# Patient Record
Sex: MALE | URBAN - METROPOLITAN AREA
[De-identification: names, ages, dates, MRNs, and addresses within clinical notes are randomized per-mention and may not be internally consistent; named-entity substitution may affect disease eponyms.]

---

## 2018-10-09 ENCOUNTER — OFFICE VISIT (OUTPATIENT)
Dept: FAMILY MEDICINE CLINIC | Age: 23
End: 2018-10-09

## 2018-10-09 VITALS
RESPIRATION RATE: 16 BRPM | WEIGHT: 253.6 LBS | OXYGEN SATURATION: 99 % | BODY MASS INDEX: 36.31 KG/M2 | SYSTOLIC BLOOD PRESSURE: 131 MMHG | TEMPERATURE: 98.9 F | DIASTOLIC BLOOD PRESSURE: 88 MMHG | HEART RATE: 72 BPM | HEIGHT: 70 IN

## 2018-10-09 DIAGNOSIS — L08.9 STAPH SKIN INFECTION: Primary | ICD-10-CM

## 2018-10-09 DIAGNOSIS — B95.8 STAPH SKIN INFECTION: Primary | ICD-10-CM

## 2018-10-09 PROBLEM — E66.01 SEVERE OBESITY (HCC): Status: ACTIVE | Noted: 2018-10-09

## 2018-10-09 RX ORDER — MERCAPTOPURINE 50 MG/1
TABLET ORAL
Refills: 3 | COMMUNITY
Start: 2018-08-13

## 2018-10-09 RX ORDER — SULFAMETHOXAZOLE AND TRIMETHOPRIM 800; 160 MG/1; MG/1
1 TABLET ORAL 2 TIMES DAILY
Qty: 6 TAB | Refills: 0 | Status: SHIPPED | OUTPATIENT
Start: 2018-10-09 | End: 2018-10-16

## 2018-10-09 RX ORDER — MUPIROCIN 20 MG/G
OINTMENT TOPICAL
Qty: 30 G | Refills: 3 | Status: SHIPPED | OUTPATIENT
Start: 2018-10-09 | End: 2019-02-13

## 2018-10-09 RX ORDER — ONDANSETRON 8 MG/1
TABLET, ORALLY DISINTEGRATING ORAL
Refills: 2 | COMMUNITY
Start: 2018-10-04

## 2018-10-09 RX ORDER — INFLIXIMAB 100 MG/10ML
INJECTION, POWDER, LYOPHILIZED, FOR SOLUTION INTRAVENOUS
COMMUNITY

## 2018-10-09 RX ORDER — BUTALBITAL, ACETAMINOPHEN AND CAFFEINE 50; 325; 40 MG/1; MG/1; MG/1
TABLET ORAL
COMMUNITY
Start: 2018-10-04

## 2018-10-09 RX ORDER — LOPERAMIDE HCL 2 MG
1000 TABLET ORAL
COMMUNITY
Start: 2015-08-20

## 2018-10-09 RX ORDER — BUPROPION HYDROCHLORIDE 150 MG/1
TABLET ORAL
Refills: 0 | COMMUNITY
Start: 2018-08-11

## 2018-10-09 RX ORDER — AZELASTINE 1 MG/ML
SPRAY, METERED NASAL
Refills: 6 | COMMUNITY
Start: 2018-07-16

## 2018-10-09 RX ORDER — ALBUTEROL SULFATE 0.83 MG/ML
SOLUTION RESPIRATORY (INHALATION)
COMMUNITY
Start: 2016-05-31

## 2018-10-09 RX ORDER — LEVOCETIRIZINE DIHYDROCHLORIDE 5 MG/1
TABLET, FILM COATED ORAL
Refills: 5 | COMMUNITY
Start: 2018-09-19

## 2018-10-09 RX ORDER — MESALAMINE 375 MG/1
CAPSULE, EXTENDED RELEASE ORAL
Refills: 3 | COMMUNITY
Start: 2018-08-13

## 2018-10-09 RX ORDER — NYSTATIN AND TRIAMCINOLONE ACETONIDE 100000; 1 [USP'U]/G; MG/G
CREAM TOPICAL 2 TIMES DAILY
Qty: 30 G | Refills: 0 | Status: SHIPPED | OUTPATIENT
Start: 2018-10-09 | End: 2019-02-13

## 2018-10-09 RX ORDER — MONTELUKAST SODIUM 10 MG/1
10 TABLET ORAL
COMMUNITY

## 2018-10-09 RX ORDER — ALBUTEROL SULFATE 90 UG/1
2 AEROSOL, METERED RESPIRATORY (INHALATION)
COMMUNITY
Start: 2016-05-31

## 2018-10-09 NOTE — MR AVS SNAPSHOT
[address] [place] [address] [phone] 
[phone] Patient: Shey Martinez MRN: [id number] :1995 Visit Information Date & Time Provider Department Dept. Phone Encounter #  
 10/9/2018  9:30 AM Juanna Schaumann, MD [email or web address] [phone] [id number] Upcoming Health Maintenance Date Due DTaP/Tdap/Td series (1 - Tdap) 2017 Influenza Age 5 to Adult 2018 Allergies as of 10/9/2018  Review Complete On: 10/9/2018 By: Lyla Frias No Known Allergies Current Immunizations  Never Reviewed No immunizations on file. Not reviewed this visit You Were Diagnosed With   
  
 Codes Comments Staph skin infection    -  Primary ICD-10-CM: L08.9, B95.8 ICD-9-CM: 686.9, 041.10 Vitals BP Pulse Temp Resp Height(growth percentile) Weight(growth percentile) 131/88 (BP 1 Location: Left arm, BP Patient Position: Sitting) 72 98.9 °F (37.2 °C) (Oral) 16 5' 10\" (1.778 m) 253 lb 9.6 oz (115 kg) SpO2 BMI Smoking Status 99% 36.39 kg/m2 Never Smoker BMI and BSA Data Body Mass Index Body Surface Area  
 36.39 kg/m 2 2.38 m 2 Preferred Pharmacy Pharmacy Name Phone [id number] [address] Shamir Barrow [phone] Your Updated Medication List  
  
   
This list is accurate as of 10/9/18  9:49 AM.  Always use your most recent med list.  
  
  
  
  
 * albuterol 2.5 mg /3 mL (0.083 %) nebulizer solution Commonly known as:  PROVENTIL VENTOLIN Take by nebulization every 6 (six) hours as needed. * VENTOLIN HFA 90 mcg/actuation inhaler Generic drug:  albuterol Take 2 Puffs by inhalation. APRISO 0.375 gram 24 hour capsule Generic drug:  mesalamine ER  
TAKE 1 CAPSULE BY MOUTH 4 TIMES DAILY. azelastine 137 mcg (0.1 %) nasal spray Commonly known as:  ASTELIN  
INSTILL 2 SPRAYS IN EACH NOSTRIL TWICE A DAY  
  
 buPROPion  mg tablet Commonly known as:  WELLBUTRIN XL  
TAKE 1 TABLET BY MOUTH EVERY DAY IN THE MORNING  
  
 butalbital-acetaminophen-caffeine -40 mg per tablet Commonly known as:  FIORICET, ESGIC  
  
 cyanocobalamin ER 1,000 mcg tablet Take 1,000 mcg by mouth. inFLIXimab 100 mg injection Commonly known as:  REMICADE  
by IntraVENous route. levocetirizine 5 mg tablet Commonly known as:  Rose Pavy TAKE ONE TABLET BY MOUTH ONE TIME DAILY  
  
 mercaptopurine 50 mg tablet Commonly known as:  PURINETHOL  
TAKE 1/2 TABLET BY MOUTH DAILY  
  
 montelukast 10 mg tablet Commonly known as:  SINGULAIR Take 10 mg by mouth.  
  
 mupirocin 2 % ointment Commonly known as:  [facility] Apply to each side of nasal septum twice daily for 7 days  
  
 nystatin-triamcinolone topical cream  
Commonly known as:  MYCOLOG II Apply  to affected area two (2) times a day. ondansetron 8 mg disintegrating tablet Commonly known as:  ZOFRAN ODT  
TAKE 1 TABLET BY MOUTH EVERY 8 HOURS AS NEEDED  
  
 trimethoprim-sulfamethoxazole 160-800 mg per tablet Commonly known as:  BACTRIM DS, SEPTRA DS Take 1 Tab by mouth two (2) times a day for 7 days. * Notice: This list has 2 medication(s) that are the same as other medications prescribed for you. Read the directions carefully, and ask your doctor or other care provider to review them with you. Prescriptions Printed Refills  
 nystatin-triamcinolone (MYCOLOG II) topical cream 0 Sig: Apply  to affected area two (2) times a day. Class: Print Route: Topical  
  
Prescriptions Sent to Pharmacy Refills  
 trimethoprim-sulfamethoxazole (BACTRIM DS, SEPTRA DS) 160-800 mg per tablet 0 Sig: Take 1 Tab by mouth two (2) times a day for 7 days. Class: Normal  
 Pharmacy: [address] [place] [address], [address] [place] [address] #: [phone]  Route: Oral  
 mupirocin (BACTROBAN) 2 % ointment 3  
 Sig: Apply to each side of nasal septum twice daily for 7 days Class: Normal  
 Pharmacy: [address] [place] [address], [address] [facility] #: [phone] Patient Instructions Take the oral antibiotic as prescribed, and use the antibiotic ointment twice daily to each side of the nasal septum for 7 days. Get Hibiclens at your pharmacy and wash your body with it weekly. If the belly button rash doesn't clear up, fill the prescription for Mycolog and use it. Introducing [facility]! [facility] introduces KeepIdeas patient portal. Now you can access parts of your medical record, email your doctor's office, and request medication refills online. 1. In your internet browser, go to [email or web address] 2. Click on the First Time User? Click Here link in the Sign In box. You will see the New Member Sign Up page. 3. Enter your KeepIdeas Access Code exactly as it appears below. You will not need to use this code after youve completed the sign-up process. If you do not sign up before the expiration date, you must request a new code. · KeepIdeas Access Code: [email or web address] Expires: 1/7/2019  9:49 AM 
 
4. Enter the last four digits of your Social Security Number (xxxx) and Date of Birth (mm/dd/yyyy) as indicated and click Submit. You will be taken to the next sign-up page. 5. Create a KeepIdeas ID. This will be your KeepIdeas login ID and cannot be changed, so think of one that is secure and easy to remember. 6. Create a KeepIdeas password. You can change your password at any time. 7. Enter your Password Reset Question and Answer. This can be used at a later time if you forget your password. 8. Enter your e-mail address. You will receive e-mail notification when new information is available in [address] 9. Click Sign Up. You can now view and download portions of your medical record.  
10. Click the Download Summary menu link to download a portable copy of your medical information. If you have questions, please visit the Frequently Asked Questions section of the InVision website. Remember, InVision is NOT to be used for urgent needs. For medical emergencies, dial 911. Now available from your iPhone and Android! Please provide this summary of care documentation to your next provider. If you have any questions after today's visit, please call [phone].

## 2018-10-09 NOTE — PATIENT INSTRUCTIONS
Take the oral antibiotic as prescribed, and use the antibiotic ointment twice daily to each side of the nasal septum for 7 days. Get Hibiclens at your pharmacy and wash your body with it weekly. If the belly button rash doesn't clear up, fill the prescription for Mycolog and use it.

## 2018-10-09 NOTE — PROGRESS NOTES
Rm 2 
Patient presents to the clinic Chief Complaint Patient presents with  
 Skin Problem in belly button and nostril Depression Screening: PHQ over the last two weeks 10/9/2018 Little interest or pleasure in doing things Not at all Feeling down, depressed, irritable, or hopeless Not at all Total Score PHQ 2 0 Learning Assessment: 
Learning Assessment 10/9/2018 PRIMARY LEARNER Patient HIGHEST LEVEL OF EDUCATION - PRIMARY LEARNER  SOME COLLEGE  
BARRIERS PRIMARY LEARNER NONE  
CO-LEARNER CAREGIVER No  
PRIMARY LANGUAGE ENGLISH  
LEARNER PREFERENCE PRIMARY DEMONSTRATION  
ANSWERED BY patient RELATIONSHIP SELF Abuse Screening: No flowsheet data found. Health Maintenance reviewed and discussed per provider: yes Coordination of Care: 1. Have you been to the ER, urgent care clinic since your last visit? Hospitalized since your last visit? no 
 
2. Have you seen or consulted any other health care providers outside of the [address] [place] [address] since your last visit? Include any pap smears or colon screening.  no

## 2018-10-09 NOTE — PROGRESS NOTES
HISTORY OF PRESENT ILLNESS Marlin Aguilera is a 26 y.o. male. HPI Comments: Shawn Young is here because of a probable staph infection. He has ulcerative colitis and takes Remicade and it predisposes him to infections. He's had staph cultured out of his nose in the past. 
 Now he has crusted lesions in his nose and his umbilicus is red and irritated. No other lesions at this time. Skin Problem Pertinent negatives include no chest pain, no abdominal pain, no headaches and no shortness of breath. Review of Systems Constitutional: Negative for chills and fever. Eyes: Negative for blurred vision and double vision. Respiratory: Negative for cough and shortness of breath. Cardiovascular: Negative for chest pain and palpitations. Gastrointestinal: Negative for abdominal pain, nausea and vomiting. Skin: Positive for rash. Neurological: Negative for headaches. Physical Exam  
Constitutional: He is oriented to person, place, and time. He appears well-developed and well-nourished. HENT:  
Crustiness and redness noted inside L nares. Eyes: Pupils are equal, round, and reactive to light. Neck: Neck supple. Cardiovascular: Normal rate, regular rhythm and normal heart sounds. Pulmonary/Chest: Effort normal and breath sounds normal. No respiratory distress. He has no wheezes. He has no rales. Abdominal: Soft. He exhibits no distension. There is no tenderness. Lymphadenopathy:  
  He has no cervical adenopathy. Neurological: He is alert and oriented to person, place, and time. Skin:  
Umbilicus is red, no drainage noted. Nursing note and vitals reviewed. ASSESSMENT and PLAN 
  ICD-10-CM ICD-9-CM 1. Staph skin infection L08.9 686.9 albuterol (PROVENTIL VENTOLIN) 2.5 mg /3 mL (0.083 %) nebulizer solution B95.8 041.10 azelastine (ASTELIN) 137 mcg (0.1 %) nasal spray buPROPion XL (WELLBUTRIN XL) 150 mg tablet butalbital-acetaminophen-caffeine (FIORICET, ESGIC) -40 mg per tablet  
   cyanocobalamin ER 1,000 mcg tablet  
   levocetirizine (XYZAL) 5 mg tablet  
   mercaptopurine (PURINETHOL) 50 mg tablet APRISO 0.375 gram 24 hour capsule  
   montelukast (SINGULAIR) 10 mg tablet  
   ondansetron (ZOFRAN ODT) 8 mg disintegrating tablet  
   albuterol (VENTOLIN HFA) 90 mcg/actuation inhaler  
   inFLIXimab (REMICADE) 100 mg injection  
   trimethoprim-sulfamethoxazole (BACTRIM DS, SEPTRA DS) 160-800 mg per tablet  
   mupirocin (BACTROBAN) 2 % ointment Not sure umbilical redness is staph. Will treat for staph though, and if it doesn't clear up, he has a Rx for Mycolog Cream to fill

## 2019-02-13 ENCOUNTER — OFFICE VISIT (OUTPATIENT)
Dept: FAMILY MEDICINE CLINIC | Age: 24
End: 2019-02-13

## 2019-02-13 VITALS
HEIGHT: 70 IN | TEMPERATURE: 98.4 F | SYSTOLIC BLOOD PRESSURE: 129 MMHG | WEIGHT: 243 LBS | OXYGEN SATURATION: 98 % | HEART RATE: 74 BPM | BODY MASS INDEX: 34.79 KG/M2 | DIASTOLIC BLOOD PRESSURE: 85 MMHG | RESPIRATION RATE: 16 BRPM

## 2019-02-13 DIAGNOSIS — H66.92 LEFT OTITIS MEDIA, UNSPECIFIED OTITIS MEDIA TYPE: ICD-10-CM

## 2019-02-13 DIAGNOSIS — R05.9 COUGH: ICD-10-CM

## 2019-02-13 DIAGNOSIS — J09.X2 INFLUENZA A (H5N1): Primary | ICD-10-CM

## 2019-02-13 DIAGNOSIS — R52 BODY ACHES: ICD-10-CM

## 2019-02-13 LAB
FLUAV+FLUBV AG NOSE QL IA.RAPID: NEGATIVE POS/NEG
FLUAV+FLUBV AG NOSE QL IA.RAPID: POSITIVE POS/NEG
VALID INTERNAL CONTROL?: YES

## 2019-02-13 RX ORDER — OSELTAMIVIR PHOSPHATE 75 MG/1
75 CAPSULE ORAL 2 TIMES DAILY
Qty: 10 CAP | Refills: 0 | Status: SHIPPED | OUTPATIENT
Start: 2019-02-13 | End: 2019-02-18

## 2019-02-13 RX ORDER — AMOXICILLIN 875 MG/1
875 TABLET, FILM COATED ORAL 2 TIMES DAILY
Qty: 20 TAB | Refills: 0 | Status: SHIPPED | OUTPATIENT
Start: 2019-02-13 | End: 2019-02-23

## 2019-02-13 NOTE — PATIENT INSTRUCTIONS
Take Tamiflu for 5 days, this will shorten the course of the flu Take the antibiotic for 10 days, this will help the ear infection and keep anything else from getting infected. Robitussin DM for cough, Advil for body aches. Influenza (Flu): Care Instructions Your Care Instructions Influenza (flu) is an infection in the lungs and breathing passages. It is caused by the influenza virus. There are different strains, or types, of the flu virus from year to year. Unlike the common cold, the flu comes on suddenly and the symptoms, such as a cough, congestion, fever, chills, fatigue, aches, and pains, are more severe. These symptoms may last up to 10 days. Although the flu can make you feel very sick, it usually doesn't cause serious health problems. Home treatment is usually all you need for flu symptoms. But your doctor may prescribe antiviral medicine to prevent other health problems, such as pneumonia, from developing. Older people and those who have a long-term health condition, such as lung disease, are most at risk for having pneumonia or other health problems. Follow-up care is a key part of your treatment and safety. Be sure to make and go to all appointments, and call your doctor if you are having problems. It's also a good idea to know your test results and keep a list of the medicines you take. How can you care for yourself at home? · Get plenty of rest. 
· Drink plenty of fluids, enough so that your urine is light yellow or clear like water. If you have kidney, heart, or liver disease and have to limit fluids, talk with your doctor before you increase the amount of fluids you drink. · Take an over-the-counter pain medicine if needed, such as acetaminophen (Tylenol), ibuprofen (Advil, Motrin), or naproxen (Aleve), to relieve fever, headache, and muscle aches. Read and follow all instructions on the label. No one younger than 20 should take aspirin.  It has been linked to Reye syndrome, a serious illness. · Do not smoke. Smoking can make the flu worse. If you need help quitting, talk to your doctor about stop-smoking programs and medicines. These can increase your chances of quitting for good. · Breathe moist air from a hot shower or from a sink filled with hot water to help clear a stuffy nose. · Before you use cough and cold medicines, check the label. These medicines may not be safe for young children or for people with certain health problems. · If the skin around your nose and lips becomes sore, put some petroleum jelly on the area. · To ease coughing: ? Drink fluids to soothe a scratchy throat. ? Suck on cough drops or plain hard candy. ? Take an over-the-counter cough medicine that contains dextromethorphan to help you get some sleep. Read and follow all instructions on the label. ? Raise your head at night with an extra pillow. This may help you rest if coughing keeps you awake. · Take any prescribed medicine exactly as directed. Call your doctor if you think you are having a problem with your medicine. To avoid spreading the flu · Wash your hands regularly, and keep your hands away from your face. · Stay home from school, work, and other public places until you are feeling better and your fever has been gone for at least 24 hours. The fever needs to have gone away on its own without the help of medicine. · Ask people living with you to talk to their doctors about preventing the flu. They may get antiviral medicine to keep from getting the flu from you. · To prevent the flu in the future, get a flu vaccine every fall. Encourage people living with you to get the vaccine. · Cover your mouth when you cough or sneeze. When should you call for help? Call 911 anytime you think you may need emergency care. For example, call if: 
  · You have severe trouble breathing.  
 Call your doctor now or seek immediate medical care if:   · You have new or worse trouble breathing.  
  · You seem to be getting much sicker.  
  · You feel very sleepy or confused.  
  · You have a new or higher fever.  
  · You get a new rash.  
 Watch closely for changes in your health, and be sure to contact your doctor if: 
  · You begin to get better and then get worse.  
  · You are not getting better after 1 week. Where can you learn more? Go to http://wilber-leann.info/. Enter I014 in the search box to learn more about \"Influenza (Flu): Care Instructions. \" Current as of: September 5, 2018 Content Version: 11.9 © 9315-8056 [employer]. Care instructions adapted under license by [employer] (which disclaims liability or warranty for this information). If you have questions about a medical condition or this instruction, always ask your healthcare professional. [address] any warranty or liability for your use of this information.

## 2019-02-13 NOTE — LETTER
NOTIFICATION RETURN TO WORK / SCHOOL 
 
2/13/2019 10:06 AM 
 
Mr. Ryan Up [id number] To Whom It May Concern: 
 
Ryan Up is currently under the care of [facility]. He will return to work/school on: 2/18/2019. Excuse until then for medical reasons. If there are questions or concerns please have the patient contact our office. Sincerely, Javier Pena MD

## 2019-02-13 NOTE — PROGRESS NOTES
HISTORY OF PRESENT ILLNESS French Baumgarten is a 26 y.o. male. Concha Gaston is here because of congestion, body aches, dry cough and headache for two days. He has felt warm but hasn't checked his temp. He did not get his flu shot Review of Systems Constitutional: Positive for malaise/fatigue. Negative for chills, fever and weight loss. HENT: Positive for congestion and sore throat. Negative for hearing loss. Eyes: Negative for blurred vision, double vision and photophobia. Respiratory: Positive for cough. Negative for sputum production and shortness of breath. Cardiovascular: Negative for chest pain, palpitations and orthopnea. Gastrointestinal: Negative for blood in stool, constipation, diarrhea and heartburn. Genitourinary: Negative for dysuria, frequency and urgency. Musculoskeletal: Positive for myalgias. Negative for back pain, joint pain and neck pain. Skin: Negative for rash. Neurological: Positive for headaches. Negative for dizziness and focal weakness. Physical Exam  
Constitutional: He appears well-developed and well-nourished. HENT:  
Right Ear: Tympanic membrane, external ear and ear canal normal.  
Left Ear: Tympanic membrane, external ear and ear canal normal.  
Nose: Nose normal.  
Mouth/Throat: Uvula is midline, oropharynx is clear and moist and mucous membranes are normal. No posterior oropharyngeal erythema. L TM moderately red, R normal.  
Eyes: Conjunctivae are normal. Pupils are equal, round, and reactive to light. Right conjunctiva is not injected. Left conjunctiva is not injected. Neck: Neck supple. No thyromegaly present. Cardiovascular: Normal rate and regular rhythm. Pulmonary/Chest: Effort normal and breath sounds normal. No respiratory distress. He has no wheezes. He has no rales. Lymphadenopathy:  
  He has no cervical adenopathy. Skin: No rash noted. Nursing note and vitals reviewed. Results for orders placed or performed in visit on 02/13/19 AMB POC [facility] INFLUENZA A/B TEST Result Value Ref Range VALID INTERNAL CONTROL POC Yes Influenza A Ag POC Positive Negative Pos/Neg Influenza B Ag POC Negative Negative Pos/Neg  
 
 
ASSESSMENT and PLAN 
  ICD-10-CM ICD-9-CM 1. Influenza A (H5N1) J09. X2 488.02 oseltamivir (TAMIFLU) 75 mg capsule 2. Left otitis media, unspecified otitis media type H66.92 382.9 amoxicillin (AMOXIL) 875 mg tablet 3. Body aches R52 780.96 AMB POC [facility] INFLUENZA A/B TEST 4.  Cough R05 786.2 AMB POC [facility] INFLUENZA A/B TEST

## 2019-02-13 NOTE — PROGRESS NOTES
Rm 1 
Patient presents to the clinic c/o cold symptoms like congestion,sore throat, body aches x  2 days. Depression Screening: 
3 most recent [facility] [place] Screens 2/13/2019 10/9/2018 Little interest or pleasure in doing things Not at all Not at all Feeling down, depressed, irritable, or hopeless Not at all Not at all Total Score PHQ 2 0 0 Learning Assessment: 
Learning Assessment 10/9/2018 PRIMARY LEARNER Patient HIGHEST LEVEL OF EDUCATION - PRIMARY LEARNER  SOME COLLEGE  
BARRIERS PRIMARY LEARNER NONE  
CO-LEARNER CAREGIVER No  
PRIMARY LANGUAGE ENGLISH  
LEARNER PREFERENCE PRIMARY DEMONSTRATION  
ANSWERED BY patient RELATIONSHIP SELF Abuse Screening: No flowsheet data found. Health Maintenance reviewed and discussed per provider: yes Coordination of Care: 1. Have you been to the ER, urgent care clinic since your last visit? Hospitalized since your last visit? no 
 
2. Have you seen or consulted any other health care providers outside of the [address] [place] [address] since your last visit? Include any pap smears or colon screening.  No